# Patient Record
Sex: MALE | Race: WHITE | Employment: FULL TIME | URBAN - METROPOLITAN AREA
[De-identification: names, ages, dates, MRNs, and addresses within clinical notes are randomized per-mention and may not be internally consistent; named-entity substitution may affect disease eponyms.]

---

## 2019-01-10 ENCOUNTER — HOSPITAL ENCOUNTER (OUTPATIENT)
Dept: CARDIOLOGY | Age: 24
Discharge: HOME | End: 2019-01-10
Payer: COMMERCIAL

## 2021-07-31 ENCOUNTER — HOSPITAL ENCOUNTER (EMERGENCY)
Dept: HOSPITAL 83 - ED | Age: 26
Discharge: HOME | End: 2021-07-31
Payer: COMMERCIAL

## 2021-07-31 VITALS — WEIGHT: 315 LBS | HEIGHT: 75 IN | BODY MASS INDEX: 39.17 KG/M2
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
## 2021-07-31 DIAGNOSIS — M54.2: ICD-10-CM
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
## 2021-07-31 DIAGNOSIS — F17.200: ICD-10-CM
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
## 2021-07-31 DIAGNOSIS — S06.0X9A: Primary | ICD-10-CM

## 2021-11-09 ENCOUNTER — HOSPITAL ENCOUNTER (OUTPATIENT)
Dept: GENERAL RADIOLOGY | Age: 26
Discharge: HOME | End: 2021-11-11
Payer: COMMERCIAL

## 2021-11-09 ENCOUNTER — HOSPITAL ENCOUNTER (OUTPATIENT)
Age: 26
Discharge: HOME | End: 2021-11-11
Payer: COMMERCIAL

## 2021-11-09 DIAGNOSIS — R05.9 COUGH, UNSPECIFIED: ICD-10-CM

## 2021-11-09 PROCEDURE — 71046 X-RAY EXAM CHEST 2 VIEWS: CPT

## 2022-03-15 ENCOUNTER — HOSPITAL ENCOUNTER (OUTPATIENT)
Age: 27
Discharge: HOME | End: 2022-03-17
Payer: COMMERCIAL

## 2022-03-15 ENCOUNTER — HOSPITAL ENCOUNTER (OUTPATIENT)
Dept: GENERAL RADIOLOGY | Age: 27
Discharge: HOME | End: 2022-03-17
Payer: COMMERCIAL

## 2022-03-15 DIAGNOSIS — S90.32XA CONTUSION OF LEFT FOOT, INITIAL ENCOUNTER: ICD-10-CM

## 2022-03-15 PROCEDURE — 73630 X-RAY EXAM OF FOOT: CPT

## 2023-01-30 ENCOUNTER — APPOINTMENT (OUTPATIENT)
Dept: GENERAL RADIOLOGY | Age: 28
End: 2023-01-30
Payer: COMMERCIAL

## 2023-01-30 ENCOUNTER — HOSPITAL ENCOUNTER (OUTPATIENT)
Age: 28
Setting detail: OBSERVATION
Discharge: HOME | End: 2023-01-31
Attending: STUDENT IN AN ORGANIZED HEALTH CARE EDUCATION/TRAINING PROGRAM | Admitting: INTERNAL MEDICINE
Payer: COMMERCIAL

## 2023-01-30 DIAGNOSIS — R00.0 TACHYCARDIA: Primary | ICD-10-CM

## 2023-01-30 LAB
ALBUMIN SERPL-MCNC: 4.4 G/DL (ref 3.5–5.2)
ALP BLD-CCNC: 68 U/L (ref 40–129)
ALT SERPL-CCNC: 22 U/L (ref 0–40)
ANION GAP SERPL CALCULATED.3IONS-SCNC: 14 MMOL/L (ref 7–16)
AST SERPL-CCNC: 18 U/L (ref 0–39)
BASOPHILS ABSOLUTE: 0.06 E9/L (ref 0–0.2)
BASOPHILS RELATIVE PERCENT: 0.5 % (ref 0–2)
BILIRUB SERPL-MCNC: 0.3 MG/DL (ref 0–1.2)
BILIRUBIN URINE: NEGATIVE
BLOOD, URINE: NEGATIVE
BUN BLDV-MCNC: 11 MG/DL (ref 6–20)
CALCIUM SERPL-MCNC: 9.8 MG/DL (ref 8.6–10.2)
CHLORIDE BLD-SCNC: 101 MMOL/L (ref 98–107)
CLARITY: CLEAR
CO2: 23 MMOL/L (ref 22–29)
COLOR: YELLOW
CREAT SERPL-MCNC: 0.9 MG/DL (ref 0.7–1.2)
D DIMER: <200 NG/ML DDU
EOSINOPHILS ABSOLUTE: 0.06 E9/L (ref 0.05–0.5)
EOSINOPHILS RELATIVE PERCENT: 0.5 % (ref 0–6)
GFR SERPL CREATININE-BSD FRML MDRD: >60 ML/MIN/1.73
GLUCOSE BLD-MCNC: 104 MG/DL (ref 74–99)
GLUCOSE URINE: NEGATIVE MG/DL
HCT VFR BLD CALC: 48.1 % (ref 37–54)
HEMOGLOBIN: 15.9 G/DL (ref 12.5–16.5)
IMMATURE GRANULOCYTES #: 0.03 E9/L
IMMATURE GRANULOCYTES %: 0.3 % (ref 0–5)
KETONES, URINE: NEGATIVE MG/DL
LEUKOCYTE ESTERASE, URINE: NEGATIVE
LYMPHOCYTES ABSOLUTE: 1.1 E9/L (ref 1.5–4)
LYMPHOCYTES RELATIVE PERCENT: 9.3 % (ref 20–42)
MCH RBC QN AUTO: 29.2 PG (ref 26–35)
MCHC RBC AUTO-ENTMCNC: 33.1 % (ref 32–34.5)
MCV RBC AUTO: 88.3 FL (ref 80–99.9)
MONOCYTES ABSOLUTE: 0.72 E9/L (ref 0.1–0.95)
MONOCYTES RELATIVE PERCENT: 6.1 % (ref 2–12)
NEUTROPHILS ABSOLUTE: 9.88 E9/L (ref 1.8–7.3)
NEUTROPHILS RELATIVE PERCENT: 83.3 % (ref 43–80)
NITRITE, URINE: NEGATIVE
PDW BLD-RTO: 13.4 FL (ref 11.5–15)
PH UA: 6.5 (ref 5–9)
PLATELET # BLD: 344 E9/L (ref 130–450)
PMV BLD AUTO: 11.6 FL (ref 7–12)
POTASSIUM SERPL-SCNC: 4 MMOL/L (ref 3.5–5)
PROTEIN UA: NEGATIVE MG/DL
RBC # BLD: 5.45 E12/L (ref 3.8–5.8)
SODIUM BLD-SCNC: 138 MMOL/L (ref 132–146)
SPECIFIC GRAVITY UA: <=1.005 (ref 1–1.03)
TOTAL PROTEIN: 7.5 G/DL (ref 6.4–8.3)
TROPONIN, HIGH SENSITIVITY: <6 NG/L (ref 0–11)
TSH SERPL DL<=0.05 MIU/L-ACNC: 0.96 UIU/ML (ref 0.27–4.2)
UROBILINOGEN, URINE: 0.2 E.U./DL
WBC # BLD: 11.9 E9/L (ref 4.5–11.5)

## 2023-01-30 PROCEDURE — 96361 HYDRATE IV INFUSION ADD-ON: CPT

## 2023-01-30 PROCEDURE — 96360 HYDRATION IV INFUSION INIT: CPT

## 2023-01-30 PROCEDURE — G0378 HOSPITAL OBSERVATION PER HR: HCPCS

## 2023-01-30 PROCEDURE — 2580000003 HC RX 258: Performed by: STUDENT IN AN ORGANIZED HEALTH CARE EDUCATION/TRAINING PROGRAM

## 2023-01-30 PROCEDURE — 85025 COMPLETE CBC W/AUTO DIFF WBC: CPT

## 2023-01-30 PROCEDURE — 96372 THER/PROPH/DIAG INJ SC/IM: CPT

## 2023-01-30 PROCEDURE — 85378 FIBRIN DEGRADE SEMIQUANT: CPT

## 2023-01-30 PROCEDURE — 6360000002 HC RX W HCPCS: Performed by: PHYSICIAN ASSISTANT

## 2023-01-30 PROCEDURE — 2580000003 HC RX 258: Performed by: PHYSICIAN ASSISTANT

## 2023-01-30 PROCEDURE — 80053 COMPREHEN METABOLIC PANEL: CPT

## 2023-01-30 PROCEDURE — 84443 ASSAY THYROID STIM HORMONE: CPT

## 2023-01-30 PROCEDURE — 99285 EMERGENCY DEPT VISIT HI MDM: CPT

## 2023-01-30 PROCEDURE — 71045 X-RAY EXAM CHEST 1 VIEW: CPT

## 2023-01-30 PROCEDURE — 81003 URINALYSIS AUTO W/O SCOPE: CPT

## 2023-01-30 PROCEDURE — 84484 ASSAY OF TROPONIN QUANT: CPT

## 2023-01-30 PROCEDURE — 93005 ELECTROCARDIOGRAM TRACING: CPT | Performed by: STUDENT IN AN ORGANIZED HEALTH CARE EDUCATION/TRAINING PROGRAM

## 2023-01-30 RX ORDER — ACETAMINOPHEN 325 MG/1
650 TABLET ORAL EVERY 6 HOURS PRN
Status: DISCONTINUED | OUTPATIENT
Start: 2023-01-30 | End: 2023-01-31 | Stop reason: HOSPADM

## 2023-01-30 RX ORDER — SODIUM CHLORIDE 0.9 % (FLUSH) 0.9 %
5-40 SYRINGE (ML) INJECTION PRN
Status: DISCONTINUED | OUTPATIENT
Start: 2023-01-30 | End: 2023-01-31 | Stop reason: HOSPADM

## 2023-01-30 RX ORDER — ENOXAPARIN SODIUM 100 MG/ML
40 INJECTION SUBCUTANEOUS 2 TIMES DAILY
Status: DISCONTINUED | OUTPATIENT
Start: 2023-01-30 | End: 2023-01-31 | Stop reason: HOSPADM

## 2023-01-30 RX ORDER — SODIUM CHLORIDE 0.9 % (FLUSH) 0.9 %
5-40 SYRINGE (ML) INJECTION EVERY 12 HOURS SCHEDULED
Status: DISCONTINUED | OUTPATIENT
Start: 2023-01-30 | End: 2023-01-31 | Stop reason: HOSPADM

## 2023-01-30 RX ORDER — PROMETHAZINE HYDROCHLORIDE 25 MG/1
12.5 TABLET ORAL EVERY 6 HOURS PRN
Status: DISCONTINUED | OUTPATIENT
Start: 2023-01-30 | End: 2023-01-31 | Stop reason: HOSPADM

## 2023-01-30 RX ORDER — ONDANSETRON 2 MG/ML
4 INJECTION INTRAMUSCULAR; INTRAVENOUS EVERY 6 HOURS PRN
Status: DISCONTINUED | OUTPATIENT
Start: 2023-01-30 | End: 2023-01-31 | Stop reason: HOSPADM

## 2023-01-30 RX ORDER — 0.9 % SODIUM CHLORIDE 0.9 %
1000 INTRAVENOUS SOLUTION INTRAVENOUS ONCE
Status: COMPLETED | OUTPATIENT
Start: 2023-01-30 | End: 2023-01-30

## 2023-01-30 RX ORDER — POLYETHYLENE GLYCOL 3350 17 G/17G
17 POWDER, FOR SOLUTION ORAL DAILY PRN
Status: DISCONTINUED | OUTPATIENT
Start: 2023-01-30 | End: 2023-01-31 | Stop reason: HOSPADM

## 2023-01-30 RX ORDER — SODIUM CHLORIDE 9 MG/ML
25 INJECTION, SOLUTION INTRAVENOUS PRN
Status: DISCONTINUED | OUTPATIENT
Start: 2023-01-30 | End: 2023-01-31 | Stop reason: HOSPADM

## 2023-01-30 RX ORDER — ACETAMINOPHEN 650 MG/1
650 SUPPOSITORY RECTAL EVERY 6 HOURS PRN
Status: DISCONTINUED | OUTPATIENT
Start: 2023-01-30 | End: 2023-01-31 | Stop reason: HOSPADM

## 2023-01-30 RX ADMIN — SODIUM CHLORIDE 1000 ML: 9 INJECTION, SOLUTION INTRAVENOUS at 15:32

## 2023-01-30 RX ADMIN — SODIUM CHLORIDE, PRESERVATIVE FREE 10 ML: 5 INJECTION INTRAVENOUS at 22:53

## 2023-01-30 RX ADMIN — ENOXAPARIN SODIUM 40 MG: 100 INJECTION SUBCUTANEOUS at 22:53

## 2023-01-30 ASSESSMENT — ENCOUNTER SYMPTOMS
DIARRHEA: 0
SORE THROAT: 0
COUGH: 0
EYE DISCHARGE: 0
EYE PAIN: 0
WHEEZING: 0
ABDOMINAL PAIN: 0
VOMITING: 0
SINUS PRESSURE: 0
EYE REDNESS: 0
NAUSEA: 0
SHORTNESS OF BREATH: 0
BACK PAIN: 0

## 2023-01-30 ASSESSMENT — PAIN - FUNCTIONAL ASSESSMENT
PAIN_FUNCTIONAL_ASSESSMENT: NONE - DENIES PAIN

## 2023-01-30 ASSESSMENT — LIFESTYLE VARIABLES
HOW MANY STANDARD DRINKS CONTAINING ALCOHOL DO YOU HAVE ON A TYPICAL DAY: 1 OR 2
HOW OFTEN DO YOU HAVE A DRINK CONTAINING ALCOHOL: MONTHLY OR LESS

## 2023-01-30 NOTE — ED PROVIDER NOTES
Department of Emergency Medicine   ED  Provider Note  Admit Date/RoomTime: 1/30/2023  1:47 PM  ED Room: [facility]/[address]     Christina Ferreira is a 32 y.o. male with a PMHx significant for  overweight  who presents for evaluation of palpitations, lightheadedness, beginning prior to arrival.  The complaint has been intermittent, moderate in severity, and worsened by nothing. The patient states that he has been having issues on and off for quite some time well over a year now with waking up feeling some palpitations and lightheadedness. States he is spoken with his PCP about this and they believe it was more anxiety. Notes that today it has been persistent which raises concerns for his presenting for further evaluation treatment. States that woke up this morning feeling lightheaded dizzy and passed out with associated palpitations. States that in the past is gone away however this time it has persisted. Currently does feel improved. Denies any recreational drug use, alcohol use. States that he tried lose weight therefore he has 2 cups of coffee denies any caffeine intake. He is not on any other stimulants. Denies any nausea, vomiting, diarrhea, urinary symptoms. Review of Systems   Constitutional:  Negative for chills and fever. HENT:  Negative for ear pain, sinus pressure and sore throat. Eyes:  Negative for pain, discharge and redness. Respiratory:  Negative for cough, shortness of breath and wheezing. Cardiovascular:  Positive for palpitations. Negative for chest pain. Gastrointestinal:  Negative for abdominal pain, diarrhea, nausea and vomiting. Genitourinary:  Negative for dysuria and frequency. Musculoskeletal:  Negative for arthralgias and back pain. Skin:  Negative for rash and wound. Neurological:  Positive for light-headedness. Negative for weakness and headaches. Hematological:  Negative for adenopathy. All other systems reviewed and are negative. Physical Exam  Vitals and nursing note reviewed. Constitutional:       General: He is not in acute distress. Appearance: Normal appearance. He is well-developed. He is obese. He is not ill-appearing. HENT:      Head: Normocephalic and atraumatic. Right Ear: External ear normal.      Left Ear: External ear normal.   Eyes:      General:         Right eye: No discharge. Left eye: No discharge. Extraocular Movements: Extraocular movements intact. Conjunctiva/sclera: Conjunctivae normal.   Cardiovascular:      Rate and Rhythm: Regular rhythm. Tachycardia present. Heart sounds: Normal heart sounds. No murmur heard. Pulmonary:      Effort: Pulmonary effort is normal. No respiratory distress. Breath sounds: Normal breath sounds. No stridor. No wheezing or rhonchi. Abdominal:      General: There is no distension. Palpations: Abdomen is soft. There is no mass. Tenderness: There is no abdominal tenderness. Hernia: No hernia is present. Musculoskeletal:         General: No swelling or tenderness. Cervical back: Normal range of motion and neck supple. Skin:     General: Skin is warm and dry. Coloration: Skin is not jaundiced or pale. Neurological:      General: No focal deficit present. Mental Status: He is alert. Procedures    [facility]       ED Course as of 02/01/23 1218   Mon Jan 30, 2023   1847 Spoke with Carisa Gilbert for Dr. Amy Arciniega, discussed the patient  He will admit the patient. [BB]      ED Course User Index  [BB] [facility] DO        Differential diagnosis: cardiac arrhythmia, hyperthyroidism, PE, electrolyte abnormality, less likely CAD,   Review of ED/ Outpatient Records: none  Historians that case was discussed with: none  My EKG interpretation:   EKG: This EKG is signed and interpreted by me.     Rate: 140  Rhythm: Sinus  Interpretation: sinus tachycardia, no visible ST-Elevations or depressions; non-specific t-waves, , QRS 88, QTc 418  Comparison: no previous EKG available   Imaging interpretation by myself: CXR negative for evidence of infiltrate or pneumothorax  Discussed with other providers: Brian Schaeffer for Dr. Ra Fraga who will admit the patient  Tests Considered but not ordered: none  Decision making tools/risks stratification / MDM / Independent Interpretation of labs: Ej Marrero presents to the ED for evaluation of palpitations and lightheadedness. History from patient and mother/father at bedside, with no limitations. Workup in the ED revealed patient in no acute distress. He was tachycardic on presentation with HR of 138 and 140 on EKG. Labs were obtained, ddimer <200 making PE less likely, troponin <6, electrolytes within normal limits. He was given IVF with some improvement of HR but he continued to be tachycardic in the department despite this. There was no evidence of infection, patient was afebrile, not hypotensive, slight leukocytosis of 11.9. CXR negative for acute findings. It is felt the patient needs admission for further evaluation from cardiology. Social Determinants of health impacting treatment or disposition: none  CODE status and Discussions: nonePatient requires continued workup and management of their symptoms and will be admitted to the hospital for further evaluation and treatment. I am the Primary Clinician of Record.      --------------------------------------------- PAST HISTORY ---------------------------------------------  Past Medical History:  has a past medical history of Fractures, Metatarsalgia of left foot, Sprain, and Sprain. Past Surgical History:  has a past surgical history that includes Tonsillectomy; Cutler tooth extraction; and Parotidectomy. Social History:  reports that he has never smoked. He has never used smokeless tobacco.    Family History: family history is not on file. The patients home medications have been reviewed.     Allergies: Patient has no known allergies.     -------------------------------------------------- RESULTS -------------------------------------------------    LABS:  Results for orders placed or performed during the hospital encounter of 01/30/23   CBC with Auto Differential   Result Value Ref Range    WBC 11.9 (H) 4.5 - 11.5 E9/L    RBC 5.45 3.80 - 5.80 E12/L    Hemoglobin 15.9 12.5 - 16.5 g/dL    Hematocrit 48.1 37.0 - 54.0 %    MCV 88.3 80.0 - 99.9 fL    MCH 29.2 26.0 - 35.0 pg    MCHC 33.1 32.0 - 34.5 %    RDW 13.4 11.5 - 15.0 fL    Platelets 903 298 - 161 E9/L    MPV 11.6 7.0 - 12.0 fL    Neutrophils % 83.3 (H) 43.0 - 80.0 %    Immature Granulocytes % 0.3 0.0 - 5.0 %    Lymphocytes % 9.3 (L) 20.0 - 42.0 %    Monocytes % 6.1 2.0 - 12.0 %    Eosinophils % 0.5 0.0 - 6.0 %    Basophils % 0.5 0.0 - 2.0 %    Neutrophils Absolute 9.88 (H) 1.80 - 7.30 E9/L    Immature Granulocytes # 0.03 E9/L    Lymphocytes Absolute 1.10 (L) 1.50 - 4.00 E9/L    Monocytes Absolute 0.72 0.10 - 0.95 E9/L    Eosinophils Absolute 0.06 0.05 - 0.50 E9/L    Basophils Absolute 0.06 0.00 - 0.20 E9/L   CMP   Result Value Ref Range    Sodium 138 132 - 146 mmol/L    Potassium 4.0 3.5 - 5.0 mmol/L    Chloride 101 98 - 107 mmol/L    CO2 23 22 - 29 mmol/L    Anion Gap 14 7 - 16 mmol/L    Glucose 104 (H) 74 - 99 mg/dL    BUN 11 6 - 20 mg/dL    Creatinine 0.9 0.7 - 1.2 mg/dL    Est, Glom Filt Rate >60 >=60 mL/min/1.73    Calcium 9.8 8.6 - 10.2 mg/dL    Total Protein 7.5 6.4 - 8.3 g/dL    Albumin 4.4 3.5 - 5.2 g/dL    Total Bilirubin 0.3 0.0 - 1.2 mg/dL    Alkaline Phosphatase 68 40 - 129 U/L    ALT 22 0 - 40 U/L    AST 18 0 - 39 U/L   Troponin   Result Value Ref Range    Troponin, High Sensitivity <6 0 - 11 ng/L   TSH   Result Value Ref Range    TSH 0.964 0.270 - 4.200 uIU/mL   Urinalysis   Result Value Ref Range    Color, UA Yellow Straw/Yellow    Clarity, UA Clear Clear    Glucose, Ur Negative Negative mg/dL    Bilirubin Urine Negative Negative    Ketones, Urine Negative Negative mg/dL    Specific Gravity, UA <=1.005 1.005 - 1.030    Blood, Urine Negative Negative    pH, UA 6.5 5.0 - 9.0    Protein, UA Negative Negative mg/dL    Urobilinogen, Urine 0.2 <2.0 E.U./dL    Nitrite, Urine Negative Negative    Leukocyte Esterase, Urine Negative Negative   D-Dimer, Quantitative   Result Value Ref Range    D-Dimer, Quant <200 ng/mL DDU   Comprehensive Metabolic Panel w/ Reflex to MG   Result Value Ref Range    Sodium 139 132 - 146 mmol/L    Potassium reflex Magnesium 3.5 3.5 - 5.0 mmol/L    Chloride 104 98 - 107 mmol/L    CO2 23 22 - 29 mmol/L    Anion Gap 12 7 - 16 mmol/L    Glucose 88 74 - 99 mg/dL    BUN 8 6 - 20 mg/dL    Creatinine 0.8 0.7 - 1.2 mg/dL    Est, Glom Filt Rate >60 >=60 mL/min/1.73    Calcium 8.8 8.6 - 10.2 mg/dL    Total Protein 6.5 6.4 - 8.3 g/dL    Albumin 3.8 3.5 - 5.2 g/dL    Total Bilirubin 0.5 0.0 - 1.2 mg/dL    Alkaline Phosphatase 62 40 - 129 U/L    ALT 17 0 - 40 U/L    AST 14 0 - 39 U/L   CBC auto differential   Result Value Ref Range    WBC 10.1 4.5 - 11.5 E9/L    RBC 4.88 3.80 - 5.80 E12/L    Hemoglobin 14.4 12.5 - 16.5 g/dL    Hematocrit 43.3 37.0 - 54.0 %    MCV 88.7 80.0 - 99.9 fL    MCH 29.5 26.0 - 35.0 pg    MCHC 33.3 32.0 - 34.5 %    RDW 13.5 11.5 - 15.0 fL    Platelets 326 539 - 125 E9/L    MPV 10.8 7.0 - 12.0 fL    Neutrophils % 65.2 43.0 - 80.0 %    Immature Granulocytes % 0.3 0.0 - 5.0 %    Lymphocytes % 25.1 20.0 - 42.0 %    Monocytes % 7.9 2.0 - 12.0 %    Eosinophils % 0.9 0.0 - 6.0 %    Basophils % 0.6 0.0 - 2.0 %    Neutrophils Absolute 6.57 1.80 - 7.30 E9/L    Immature Granulocytes # 0.03 E9/L    Lymphocytes Absolute 2.53 1.50 - 4.00 E9/L    Monocytes Absolute 0.80 0.10 - 0.95 E9/L    Eosinophils Absolute 0.09 0.05 - 0.50 E9/L    Basophils Absolute 0.06 0.00 - 0.20 E9/L   URINE DRUG SCREEN   Result Value Ref Range    Amphetamine Screen, Urine NOT DETECTED Negative <1000 ng/mL    Barbiturate Screen, Ur NOT DETECTED Negative < 200 ng/mL    Benzodiazepine Screen, Urine NOT DETECTED Negative < 200 ng/mL    Cannabinoid Scrn, Ur NOT DETECTED Negative < 50ng/mL    Cocaine Metabolite Screen, Urine NOT DETECTED Negative < 300 ng/mL    Opiate Scrn, Ur NOT DETECTED Negative < 300ng/mL    PCP Screen, Urine NOT DETECTED Negative < 25 ng/mL    Methadone Screen, Urine NOT DETECTED Negative <300 ng/mL    Oxycodone Urine NOT DETECTED Negative <100 ng/mL    FENTANYL SCREEN, URINE NOT DETECTED Negative <1 ng/mL    Drug Screen Comment: see below    Magnesium   Result Value Ref Range    Magnesium 2.1 1.6 - 2.6 mg/dL   Hemoglobin A1C   Result Value Ref Range    Hemoglobin A1C 5.2 4.0 - 5.6 %   T3, Free   Result Value Ref Range    T3, Free 3.6 2.0 - 4.4 pg/mL   T4, Free   Result Value Ref Range    T4 Free 1.40 0.93 - 1.70 ng/dL   EKG 12 Lead   Result Value Ref Range    Ventricular Rate 140 BPM    Atrial Rate 140 BPM    P-R Interval 146 ms    QRS Duration 88 ms    Q-T Interval 274 ms    QTc Calculation (Bazett) 418 ms    P Axis 52 degrees    R Axis 53 degrees    T Axis 21 degrees       RADIOLOGY:  XR CHEST PORTABLE   Final Result   No acute process. ------------------------- NURSING NOTES AND VITALS REVIEWED ---------------------------  Date / Time Roomed:  1/30/2023  1:47 PM  ED Bed Assignment:  [id number]    The nursing notes within the ED encounter and vital signs as below have been reviewed. Patient Vitals for the past 24 hrs:   BP   01/31/23 1615 (!) 142/95       Oxygen Saturation Interpretation: Normal    ------------------------------------------ PROGRESS NOTES ------------------------------------------  Counseling:  I have spoken with the patient and discussed todays results, in addition to providing specific details for the plan of care and counseling regarding the diagnosis and prognosis.   Their questions are answered at this time and they are agreeable with the plan of admission.    --------------------------------- ADDITIONAL PROVIDER NOTES ---------------------------------  Consultations:  Spoke with Allie Hernandes for Dr. Evangelist Rose. Discussed case. They will admit the patient. This patient's ED course included: a personal history and physicial examination, re-evaluation prior to disposition, multiple bedside re-evaluations, IV medications, cardiac monitoring, continuous pulse oximetry, and complex medical decision making and emergency management    This patient has remained hemodynamically stable during their ED course. Diagnosis:  1. Tachycardia        Disposition:  Patient's disposition: Admit to telemetry  Patient's condition is stable. Please note that the above documentation was prepared using voice recognition software. Every attempt was made to ensure accuracy but there may be spelling, grammatical, and contextual errors.          Jarett Madrid DO  02/01/23 1233

## 2023-01-31 VITALS
HEART RATE: 83 BPM | TEMPERATURE: 98.1 F | RESPIRATION RATE: 16 BRPM | OXYGEN SATURATION: 98 % | WEIGHT: 315 LBS | BODY MASS INDEX: 39.17 KG/M2 | DIASTOLIC BLOOD PRESSURE: 95 MMHG | HEIGHT: 75 IN | SYSTOLIC BLOOD PRESSURE: 142 MMHG

## 2023-01-31 LAB
ALBUMIN SERPL-MCNC: 3.8 G/DL (ref 3.5–5.2)
ALP BLD-CCNC: 62 U/L (ref 40–129)
ALT SERPL-CCNC: 17 U/L (ref 0–40)
AMPHETAMINE SCREEN, URINE: NOT DETECTED
ANION GAP SERPL CALCULATED.3IONS-SCNC: 12 MMOL/L (ref 7–16)
AST SERPL-CCNC: 14 U/L (ref 0–39)
BARBITURATE SCREEN URINE: NOT DETECTED
BASOPHILS ABSOLUTE: 0.06 E9/L (ref 0–0.2)
BASOPHILS RELATIVE PERCENT: 0.6 % (ref 0–2)
BENZODIAZEPINE SCREEN, URINE: NOT DETECTED
BILIRUB SERPL-MCNC: 0.5 MG/DL (ref 0–1.2)
BUN BLDV-MCNC: 8 MG/DL (ref 6–20)
CALCIUM SERPL-MCNC: 8.8 MG/DL (ref 8.6–10.2)
CANNABINOID SCREEN URINE: NOT DETECTED
CHLORIDE BLD-SCNC: 104 MMOL/L (ref 98–107)
CO2: 23 MMOL/L (ref 22–29)
COCAINE METABOLITE SCREEN URINE: NOT DETECTED
CREAT SERPL-MCNC: 0.8 MG/DL (ref 0.7–1.2)
EKG ATRIAL RATE: 140 BPM
EKG P AXIS: 52 DEGREES
EKG P-R INTERVAL: 146 MS
EKG Q-T INTERVAL: 274 MS
EKG QRS DURATION: 88 MS
EKG QTC CALCULATION (BAZETT): 418 MS
EKG R AXIS: 53 DEGREES
EKG T AXIS: 21 DEGREES
EKG VENTRICULAR RATE: 140 BPM
EOSINOPHILS ABSOLUTE: 0.09 E9/L (ref 0.05–0.5)
EOSINOPHILS RELATIVE PERCENT: 0.9 % (ref 0–6)
FENTANYL SCREEN, URINE: NOT DETECTED
GFR SERPL CREATININE-BSD FRML MDRD: >60 ML/MIN/1.73
GLUCOSE BLD-MCNC: 88 MG/DL (ref 74–99)
HBA1C MFR BLD: 5.2 % (ref 4–5.6)
HCT VFR BLD CALC: 43.3 % (ref 37–54)
HEMOGLOBIN: 14.4 G/DL (ref 12.5–16.5)
IMMATURE GRANULOCYTES #: 0.03 E9/L
IMMATURE GRANULOCYTES %: 0.3 % (ref 0–5)
LYMPHOCYTES ABSOLUTE: 2.53 E9/L (ref 1.5–4)
LYMPHOCYTES RELATIVE PERCENT: 25.1 % (ref 20–42)
Lab: NORMAL
MAGNESIUM: 2.1 MG/DL (ref 1.6–2.6)
MCH RBC QN AUTO: 29.5 PG (ref 26–35)
MCHC RBC AUTO-ENTMCNC: 33.3 % (ref 32–34.5)
MCV RBC AUTO: 88.7 FL (ref 80–99.9)
METHADONE SCREEN, URINE: NOT DETECTED
MONOCYTES ABSOLUTE: 0.8 E9/L (ref 0.1–0.95)
MONOCYTES RELATIVE PERCENT: 7.9 % (ref 2–12)
NEUTROPHILS ABSOLUTE: 6.57 E9/L (ref 1.8–7.3)
NEUTROPHILS RELATIVE PERCENT: 65.2 % (ref 43–80)
OPIATE SCREEN URINE: NOT DETECTED
OXYCODONE URINE: NOT DETECTED
PDW BLD-RTO: 13.5 FL (ref 11.5–15)
PHENCYCLIDINE SCREEN URINE: NOT DETECTED
PLATELET # BLD: 295 E9/L (ref 130–450)
PMV BLD AUTO: 10.8 FL (ref 7–12)
POTASSIUM REFLEX MAGNESIUM: 3.5 MMOL/L (ref 3.5–5)
RBC # BLD: 4.88 E12/L (ref 3.8–5.8)
SODIUM BLD-SCNC: 139 MMOL/L (ref 132–146)
T3 FREE: 3.6 PG/ML (ref 2–4.4)
T4 FREE: 1.4 NG/DL (ref 0.93–1.7)
TOTAL PROTEIN: 6.5 G/DL (ref 6.4–8.3)
WBC # BLD: 10.1 E9/L (ref 4.5–11.5)

## 2023-01-31 PROCEDURE — 96372 THER/PROPH/DIAG INJ SC/IM: CPT

## 2023-01-31 PROCEDURE — 80307 DRUG TEST PRSMV CHEM ANLYZR: CPT

## 2023-01-31 PROCEDURE — 93010 ELECTROCARDIOGRAM REPORT: CPT | Performed by: INTERNAL MEDICINE

## 2023-01-31 PROCEDURE — 83735 ASSAY OF MAGNESIUM: CPT

## 2023-01-31 PROCEDURE — 84481 FREE ASSAY (FT-3): CPT

## 2023-01-31 PROCEDURE — 84439 ASSAY OF FREE THYROXINE: CPT

## 2023-01-31 PROCEDURE — 2580000003 HC RX 258: Performed by: PHYSICIAN ASSISTANT

## 2023-01-31 PROCEDURE — 36415 COLL VENOUS BLD VENIPUNCTURE: CPT

## 2023-01-31 PROCEDURE — 99222 1ST HOSP IP/OBS MODERATE 55: CPT | Performed by: INTERNAL MEDICINE

## 2023-01-31 PROCEDURE — 6360000002 HC RX W HCPCS: Performed by: PHYSICIAN ASSISTANT

## 2023-01-31 PROCEDURE — 85025 COMPLETE CBC W/AUTO DIFF WBC: CPT

## 2023-01-31 PROCEDURE — 83036 HEMOGLOBIN GLYCOSYLATED A1C: CPT

## 2023-01-31 PROCEDURE — 80053 COMPREHEN METABOLIC PANEL: CPT

## 2023-01-31 PROCEDURE — 6370000000 HC RX 637 (ALT 250 FOR IP): Performed by: PHYSICIAN ASSISTANT

## 2023-01-31 PROCEDURE — G0378 HOSPITAL OBSERVATION PER HR: HCPCS

## 2023-01-31 RX ORDER — LISINOPRIL 5 MG/1
5 TABLET ORAL DAILY
Qty: 30 TABLET | Refills: 3 | Status: SHIPPED | OUTPATIENT
Start: 2023-01-31

## 2023-01-31 RX ORDER — LISINOPRIL 5 MG/1
5 TABLET ORAL DAILY
Status: DISCONTINUED | OUTPATIENT
Start: 2023-01-31 | End: 2023-01-31 | Stop reason: HOSPADM

## 2023-01-31 RX ADMIN — SODIUM CHLORIDE, PRESERVATIVE FREE 10 ML: 5 INJECTION INTRAVENOUS at 08:07

## 2023-01-31 RX ADMIN — ACETAMINOPHEN 650 MG: 325 TABLET ORAL at 08:04

## 2023-01-31 RX ADMIN — ENOXAPARIN SODIUM 40 MG: 100 INJECTION SUBCUTANEOUS at 08:05

## 2023-01-31 NOTE — CONSULTS
CHIEF COMPLAINT: Tachycardia    HISTORY OF PRESENT ILLNESS: Patient is a 32 y.o. male seen at the request of Paul Sneed MD and not followed by cardiology. Patient presents with tachycardia issues. States he has had issues of racing heart for several years, however increased of late. States he was on the way to work when he had an episode. States heart rate went to 160's. States he gets lightheaded and some episodes of near syncope, but no erika syncope. Denies other known past history aside from obesity. Currently without symptoms.      Past Medical History:   Diagnosis Date    Fractures 10/08/2012    Possible oblique fx at the base of the ulnar styloid    Metatarsalgia of left foot 2012    Mid and forefoot for 2 years - cavus foot    Sprain 11/18/2006    Left wrist sprain, possible scapholunate injury    Sprain 02/2008    Right knee medial collateral strain, possible contusion       Patient Active Problem List   Diagnosis    Tachycardia       No Known Allergies    Current Facility-Administered Medications   Medication Dose Route Frequency Provider Last Rate Last Admin    perflutren lipid microspheres (DEFINITY) injection 1.5 mL  1.5 mL IntraVENous ONCE PRN Clemetine Blunt, DO        sodium chloride flush 0.9 % injection 5-40 mL  5-40 mL IntraVENous 2 times per day Normie Barthel, PA   10 mL at 01/31/23 0807    sodium chloride flush 0.9 % injection 5-40 mL  5-40 mL IntraVENous PRN Normie Barthel, PA        0.9 % sodium chloride infusion  25 mL IntraVENous PRN Normie Barthel, PA        enoxaparin (LOVENOX) injection 40 mg  40 mg SubCUTAneous BID Normie Barthel, PA   40 mg at 01/31/23 0805    promethazine (PHENERGAN) tablet 12.5 mg  12.5 mg Oral Q6H PRN Normie Barthel, PA        Or    ondansetron (ZOFRAN) injection 4 mg  4 mg IntraVENous Q6H PRN Normie Barthel, PA        polyethylene glycol (GLYCOLAX) packet 17 g  17 g Oral Daily PRN Normie Barthel, PA        acetaminophen (TYLENOL) tablet 650 mg 650 mg Oral Q6H PRN JOLENE Jang   650 mg at 01/31/23 1510    Or    acetaminophen (TYLENOL) suppository 650 mg  650 mg Rectal Q6H PRN JOLENE Jang           Social History     Socioeconomic History    Marital status: Single     Spouse name: Not on file    Number of children: Not on file    Years of education: Not on file    Highest education level: Not on file   Occupational History    Not on file   Tobacco Use    Smoking status: Never    Smokeless tobacco: Never   Substance and Sexual Activity    Alcohol use: Not on file    Drug use: Not on file    Sexual activity: Not on file   Other Topics Concern    Not on file   Social History Narrative    Not on file     Social Determinants of Health     Financial Resource Strain: Not on file   Food Insecurity: Not on file   Transportation Needs: Not on file   Physical Activity: Not on file   Stress: Not on file   Social Connections: Not on file   Intimate Partner Violence: Not on file   Housing Stability: Not on file       History reviewed. No pertinent family history. Review of Systems:   Heart: as above   Lungs: as above   Eyes: denies changes in vision or discharge. Ears: denies changes in hearing or pain. Nose: denies epistaxis or masses   Throat: denies sore throat or trouble swallowing. Neuro: denies numbness, tingling, tremors. Skin: denies rashes or itching. : denies hematuria, dysuria   GI: denies vomiting, diarrhea   Psych: denies mood changed, anxiety, depression. all others negative. Physical Exam   BP (!) 154/86   Pulse 83   Temp 98.1 °F (36.7 °C) (Oral)   Resp 16   Ht 6' 3\" (1.905 m)   Wt (!) 337 lb 8 oz (153.1 kg)   SpO2 98%   BMI 42.18 kg/m²   Constitutional: Oriented to person, place, and time. Well-developed and well-nourished. No distress. Head: Normocephalic and atraumatic. Eyes: EOM are normal. Pupils are equal, round, and reactive to light. Neck: Normal range of motion. Neck supple.  No hepatojugular reflux and no JVD present. Carotid bruit is not present. No tracheal deviation present. No thyromegaly present. Cardiovascular: Normal rate, regular rhythm, normal heart sounds and intact distal pulses. Exam reveals no gallop and no friction rub. No murmur heard. Pulmonary/Chest: Effort normal and breath sounds normal. No respiratory distress. No wheezes. No rales. No tenderness. Abdominal: Soft. Bowel sounds are normal. No distension and no mass. No tenderness. No rebound and no guarding. Musculoskeletal: Normal range of motion. No edema and no tenderness. Lymphadenopathy:   No cervical adenopathy. No groin adenopathy. Neurological: Alert and oriented to person, place, and time. Skin: Skin is warm and dry. No rash noted. Not diaphoretic. No erythema. Psychiatric: Normal mood and affect. Behavior is normal.     CBC:   Lab Results   Component Value Date/Time    WBC 10.1 01/31/2023 02:08 AM    RBC 4.88 01/31/2023 02:08 AM    HGB 14.4 01/31/2023 02:08 AM    HCT 43.3 01/31/2023 02:08 AM    MCV 88.7 01/31/2023 02:08 AM    MCH 29.5 01/31/2023 02:08 AM    MCHC 33.3 01/31/2023 02:08 AM    RDW 13.5 01/31/2023 02:08 AM     01/31/2023 02:08 AM    MPV 10.8 01/31/2023 02:08 AM     BMP:   Lab Results   Component Value Date/Time     01/31/2023 02:08 AM    K 3.5 01/31/2023 02:08 AM     01/31/2023 02:08 AM    CO2 23 01/31/2023 02:08 AM    BUN 8 01/31/2023 02:08 AM    LABALBU 3.8 01/31/2023 02:08 AM    CREATININE 0.8 01/31/2023 02:08 AM    CALCIUM 8.8 01/31/2023 02:08 AM    LABGLOM >60 01/31/2023 02:08 AM     Magnesium:    Lab Results   Component Value Date/Time    MG 2.1 01/31/2023 02:08 AM     Cardiac Enzymes:   Lab Results   Component Value Date    TROPHS <6 01/30/2023      PT/INR:  No results found for: PROTIME, INR  TSH:    Lab Results   Component Value Date/Time    TSH 0.964 01/30/2023 02:46 PM     Rhythm Strip: normal sinus rhythm.     EKG:  nonspecific ST and T waves changes, sinus tachycardia.    ASSESSMENT AND PLAN:  Patient Active Problem List   Diagnosis    Tachycardia     1. Tachycardia:    Chart/labs normal/EKG reviewed.     No pathological rhythms documented. No overt etiology for sinus tachycardia revealed. Episodes suspicious for SVT.     Tox screen negative. D-dimer and TSH normal. TFT pending.     Monitor while here.    Echo and 7 day outpatient monitor and outpatient follow up.    Cesilia Reynoso D.O.  Cardiologist  Cardiology, [facility]

## 2023-01-31 NOTE — PLAN OF CARE
Medicare Wellness Visit, Female The best way to live healthy is to have a lifestyle where you eat a well-balanced diet, exercise regularly, limit alcohol use, and quit all forms of tobacco/nicotine, if applicable. Regular preventive services are another way to keep healthy. Preventive services (vaccines, screening tests, monitoring & exams) can help personalize your care plan, which helps you manage your own care. Screening tests can find health problems at the earliest stages, when they are easiest to treat. [facility] follows the current, evidence-based guidelines published by the [facility] Francois Headley ([facility]) when recommending preventive services for our patients. Because we follow these guidelines, sometimes recommendations change over time as research supports it. (For example, mammograms used to be recommended annually. Even though Medicare will still pay for an annual mammogram, the newer guidelines recommend a mammogram every two years for women of average risk). Of course, you and your doctor may decide to screen more often for some diseases, based on your risk and your co-morbidities (chronic disease you are already diagnosed with). Preventive services for you include: - Medicare offers their members a free annual wellness visit, which is time for you and your primary care provider to discuss and plan for your preventive service needs. Take advantage of this benefit every year! 
-All adults over the age of 72 should receive the recommended pneumonia vaccines. Current USPSTF guidelines recommend a series of two vaccines for the best pneumonia protection.  
-All adults should have a flu vaccine yearly and a tetanus vaccine every 10 years.  
-All adults age 48 and older should receive the shingles vaccines (series of two vaccines). -All adults age 38-68 who are overweight should have a diabetes screening test once every three years. Problem: Discharge Planning  Goal: Discharge to home or other facility with appropriate resources  1/31/2023 1015 by Bette Conklin RN  Outcome: Progressing  1/30/2023 2114 by Barry Pacheco RN  Outcome: Progressing  Flowsheets (Taken 1/30/2023 2103)  Discharge to home or other facility with appropriate resources: Identify barriers to discharge with patient and caregiver -All adults born between [birth year] and 1965 should be screened once for Hepatitis C. 
-Other screening tests and preventive services for persons with diabetes include: an eye exam to screen for diabetic retinopathy, a kidney function test, a foot exam, and stricter control over your cholesterol.  
-Cardiovascular screening for adults with routine risk involves an electrocardiogram (ECG) at intervals determined by your doctor.  
-Colorectal cancer screenings should be done for adults age 54-65 with no increased risk factors for colorectal cancer. There are a number of acceptable methods of screening for this type of cancer. Each test has its own benefits and drawbacks. Discuss with your doctor what is most appropriate for you during your annual wellness visit. The different tests include: colonoscopy (considered the best screening method), a fecal occult blood test, a fecal DNA test, and sigmoidoscopy. 
 
-A bone mass density test is recommended when a woman turns 65 to screen for osteoporosis. This test is only recommended one time, as a screening. Some providers will use this same test as a disease monitoring tool if you already have osteoporosis. -Breast cancer screenings are recommended every other year for women of normal risk, age 54-69. 
-Cervical cancer screenings for women over age 72 are only recommended with certain risk factors. Here is a list of your current Health Maintenance items (your personalized list of preventive services) with a due date: 
Health Maintenance Due Topic Date Due  Shingles Vaccine (1 of 2) 07/02/1982 [facility] Annual Well Visit  04/23/2020

## 2023-01-31 NOTE — PROGRESS NOTES
Unable to facilitate echo today. Will arrange echo, 7 day monitor and outpatient follow up as outpatient. Okay to discharge. Garland Pablo D.O.   Cardiologist  Cardiology, [facility]

## 2023-01-31 NOTE — CARE COORDINATION
OBS LOC. Met w/ patient . Explained role of case manage rand plan of care. Lives w/ his parents in a 2 story house- 2 steps to entrance. Independent PTA. No Hx DMEs, HHC, or RADHA. PCP is Dr. Jonelle Fuller and pharmacy is [address] [place] [address]. Per pt, plan is to return home on discharge- parents will provide transportation. No needs.  Will follow Marni Jain RN case manager

## 2023-01-31 NOTE — H&P
[facility]  History and Physical      CHIEF COMPLAINT:    Chief Complaint   Patient presents with    Dizziness     States has been feeling light headed since this morning and feels like he is going to pass out     Tachycardia     Hx of panic attacks        Patient of Mary Yo MD presents with: Tachycardia    History of Present Illness:   Patient is a 78-year-old male with no significant past medical history who presents to the emergency room for dizziness and tachycardia. Patient states this has been off and on for some and now it has followed up with his primary care doctor in which he thought to be related to anxiety. Patient states that he woke up yesterday and was found to be more persistently lightheaded dizzy with palpitations, this prompted him to come to the emergency room for further evaluation. A chest x-ray was obtained which was negative for anything acute. He was found to have a mild leukocytosis of 11.9, but all other labs were completely unremarkable. He was found to be tachycardic at 138 on arrival with a blood pressure of 168/99. Patient was given a liter of fluids in the emergency room and today on morning vitals he was found to be normal sinus rhythm with a heart rate of 83. Patient will be admitted to [place] [address] with telemetry for further work-up and treatment. REVIEW OF SYSTEMS:  Pertinent negatives are above in HPI. 10 point ROS otherwise negative.       Past Medical History:   Diagnosis Date    Fractures 10/08/2012    Possible oblique fx at the base of the ulnar styloid    Metatarsalgia of left foot 2012    Mid and forefoot for 2 years - cavus foot    Sprain 11/18/2006    Left wrist sprain, possible scapholunate injury    Sprain 02/2008    Right knee medial collateral strain, possible contusion         Past Surgical History:   Procedure Laterality Date    PAROTIDECTOMY      TONSILLECTOMY      WISDOM TOOTH EXTRACTION         Medications Prior to Admission: No medications prior to admission. Note that the patient's home medications were reviewed and the above list is accurate to the best of my knowledge at the time of the exam.    Allergies:    Patient has no known allergies. Social History:    reports that he has never smoked. He has never used smokeless tobacco.    Family History:   family history is not on file. PHYSICAL EXAM:    Vitals:  BP (!) 154/86   Pulse 83   Temp 98.1 °F (36.7 °C) (Oral)   Resp 16   Ht 6' 3\" (1.905 m)   Wt (!) 337 lb 8 oz (153.1 kg)   SpO2 98%   BMI 42.18 kg/m²       General appearance: NAD, conversant  Eyes: Sclerae anicteric, PERRLA  HEENT: AT/NC, MMM  Neck: FROM, supple, no thyromegaly  Lymph: No cervical / supraclavicular lymphadenopathy  Lungs: Clear to auscultation, WOB normal  CV: RRR, no MRGs, no lower extremity edema  Abdomen: Soft, non-tender; no masses or HSM, +BS  Extremities: FROM without synovitis. No clubbing or cyanosis of the hands. Skin: no rash, induration, lesions, or ulcers  Psych: Calm and cooperative. Normal judgement and insight. Normal mood and affect. Neuro: Alert and interactive, face symmetric, speech fluent. LABS:  All labs reviewed.   Of note:  CBC:   Lab Results   Component Value Date/Time    WBC 10.1 01/31/2023 02:08 AM    RBC 4.88 01/31/2023 02:08 AM    HGB 14.4 01/31/2023 02:08 AM    HCT 43.3 01/31/2023 02:08 AM    MCV 88.7 01/31/2023 02:08 AM    MCH 29.5 01/31/2023 02:08 AM    MCHC 33.3 01/31/2023 02:08 AM    RDW 13.5 01/31/2023 02:08 AM     01/31/2023 02:08 AM    MPV 10.8 01/31/2023 02:08 AM     CMP:    Lab Results   Component Value Date/Time     01/31/2023 02:08 AM    K 3.5 01/31/2023 02:08 AM     01/31/2023 02:08 AM    CO2 23 01/31/2023 02:08 AM    BUN 8 01/31/2023 02:08 AM    CREATININE 0.8 01/31/2023 02:08 AM    LABGLOM >60 01/31/2023 02:08 AM    GLUCOSE 88 01/31/2023 02:08 AM    PROT 6.5 01/31/2023 02:08 AM    LABALBU 3.8 01/31/2023 02:08 AM    CALCIUM 8.8 01/31/2023 02:08 AM    BILITOT 0.5 01/31/2023 02:08 AM    ALKPHOS 62 01/31/2023 02:08 AM    AST 14 01/31/2023 02:08 AM    ALT 17 01/31/2023 02:08 AM       Imaging:  CXR: Negative     EKG:  Sinus tachycardia    Telemetry:  NSR    ASSESSMENT/PLAN:  Principal Problem:    Tachycardia  Resolved Problems:    * No resolved hospital problems. *    Patient is a 32year old male admitted to med/surg with telemetry for   Tachycardia  -Monitor labs  -Check TSH, T3 and T4-TSH is low normal, and T3 and T4 to be followed up as outpatient  -Cardiology consulted secondary to father with pacemaker in place and cardiac history and family  -Liter of fluids given in ER, now with normal heart rate  -Echocardiogram can be completed as an outpatient per cardio-  -May need outpatient cardiac monitor on discharge      Hypertensive   -167/99 on arrival, some improvement since admission  -Currently 154/86 today  -Start on lisinopril 5mg daily-advised to check blood pressures at home   -patient instructed to check blood pressure before taking medication in the morning then again 1 hour following as well as at night before bed and log readings and bring to PCP at follow up. Medication for other comorbidities continue as appropriate dose adjustment as necessary.     DVT prophylaxis Lovenox   PT OT  Discharge planning      Code status: Full  Requires Inpatient level of care    Saman Hays MD

## 2023-01-31 NOTE — DISCHARGE INSTRUCTIONS
patient to check blood pressure before taking medication in the morning then again 1 hour following as well as at night before bed and log readings and bring to PCP at follow up.

## 2023-01-31 NOTE — ED NOTES
SBAR receipt confirmed with Denise Marin on [place] [id number], [address] [facility]  01/30/23 8727

## 2023-01-31 NOTE — PROGRESS NOTES
Abdirahman SIDDIQUI notified via perfect serve that patient is ok for discharge and will follow up as an outpatient with Dr. Mirta Burkitt

## 2023-01-31 NOTE — PROGRESS NOTES
CLINICAL PHARMACY NOTE: MEDS TO BEDS    Total # of Prescriptions Filled: 1   The following medications were delivered to the patient:  Lisinopril 5    Additional Documentation:

## 2023-01-31 NOTE — CARE COORDINATION
Social Work / Discharge Planning : OBSERVATION. Patient admitted with Tachycardia. Patient independent from HOME. Patient has PCP and insurance. Anticipating no eneds for SW. AWait Plan. SW to follow.  Electronically signed by LUIS Ingram on 1/31/23 at 8:15 AM EST

## 2023-01-31 NOTE — PROGRESS NOTES
[facility]Tele  [address]  [facility] [address]  Phone: [phone]             January 31, 2023    Patient: Tamiko Richards   YOB: 1995   Date of Visit: 1/30/2023       To Whom It May Concern:    Tamiko Richards was seen and treated in our facility  beginning 1/30/2023 until 01/31/2023 .        Sincerely,       Antoinette Andrews RN         Signature:__________________________________

## 2023-01-31 NOTE — PLAN OF CARE
Problem: Discharge Planning  Goal: Discharge to home or other facility with appropriate resources  Outcome: Progressing  Flowsheets (Taken 1/30/2023 2103)  Discharge to home or other facility with appropriate resources: Identify barriers to discharge with patient and caregiver

## 2023-02-01 ENCOUNTER — TELEPHONE (OUTPATIENT)
Dept: CARDIOLOGY CLINIC | Age: 28
End: 2023-02-01

## 2023-02-01 ENCOUNTER — NURSE ONLY (OUTPATIENT)
Dept: CARDIOLOGY CLINIC | Age: 28
End: 2023-02-01

## 2023-02-01 ENCOUNTER — HOSPITAL ENCOUNTER (OUTPATIENT)
Dept: CARDIOLOGY | Age: 28
Discharge: HOME | End: 2023-02-01
Payer: COMMERCIAL

## 2023-02-01 DIAGNOSIS — R07.9 CHEST PAIN, UNSPECIFIED TYPE: Primary | ICD-10-CM

## 2023-02-01 DIAGNOSIS — R00.0 TACHYCARDIA: Primary | ICD-10-CM

## 2023-02-01 DIAGNOSIS — R07.9 CHEST PAIN, UNSPECIFIED TYPE: ICD-10-CM

## 2023-02-01 LAB
LV EF: 55 %
LVEF MODALITY: NORMAL

## 2023-02-01 PROCEDURE — 93306 TTE W/DOPPLER COMPLETE: CPT

## 2023-02-01 NOTE — TELEPHONE ENCOUNTER
----- Message from Cora Delvalle DO sent at 1/31/2023  3:50 PM EST -----  Please arrange echo and 7 day monitor (XT) as soon as able and a subsequent outpatient follow up. Rosaura Brown D.O.   Cardiologist  Cardiology, [facility]

## 2023-02-01 NOTE — PROGRESS NOTES
Patient was seen today and a 7 day [employer] XT monitor was placed. Monitor was ordered by . The monitor was applied, instructions were given to the patient. Patient stated understanding and gave verbalize readback.   Monitor company:[employer]  Serial number: [id number]

## 2023-02-01 NOTE — TELEPHONE ENCOUNTER
----- Message from Jonathan Shepard DO sent at 2/1/2023  3:32 PM EST -----  Please notify patient that their shows normal heart function and valves. His right ventricle is mildly enlarge suggesting he may have sleep apnea. Nothing dangerous. We will discuss the possibility of a sleep study on his OV.      Xochitl Van

## 2023-02-16 DIAGNOSIS — R00.0 TACHYCARDIA: ICD-10-CM

## 2023-03-10 ENCOUNTER — HOSPITAL ENCOUNTER (OUTPATIENT)
Dept: CARDIOLOGY | Age: 28
Discharge: HOME | End: 2023-03-10
Payer: COMMERCIAL

## 2023-03-10 ENCOUNTER — OFFICE VISIT (OUTPATIENT)
Dept: CARDIOLOGY CLINIC | Age: 28
End: 2023-03-10
Payer: COMMERCIAL

## 2023-03-10 VITALS
WEIGHT: 315 LBS | SYSTOLIC BLOOD PRESSURE: 120 MMHG | DIASTOLIC BLOOD PRESSURE: 70 MMHG | BODY MASS INDEX: 39.17 KG/M2 | HEIGHT: 75 IN | HEART RATE: 87 BPM

## 2023-03-10 VITALS
DIASTOLIC BLOOD PRESSURE: 76 MMHG | RESPIRATION RATE: 16 BRPM | WEIGHT: 315 LBS | HEART RATE: 88 BPM | SYSTOLIC BLOOD PRESSURE: 122 MMHG | BODY MASS INDEX: 39.17 KG/M2 | HEIGHT: 75 IN

## 2023-03-10 DIAGNOSIS — R00.2 PALPITATIONS: ICD-10-CM

## 2023-03-10 DIAGNOSIS — R07.9 CHEST PAIN, UNSPECIFIED TYPE: ICD-10-CM

## 2023-03-10 DIAGNOSIS — R07.9 CHEST PAIN, UNSPECIFIED TYPE: Primary | ICD-10-CM

## 2023-03-10 LAB
CHOLESTEROL, TOTAL: 190 MG/DL (ref 0–199)
HDLC SERPL-MCNC: 34 MG/DL
LDL CHOLESTEROL CALCULATED: 137 MG/DL (ref 0–99)
TRIGL SERPL-MCNC: 95 MG/DL (ref 0–149)
VLDLC SERPL CALC-MCNC: 19 MG/DL

## 2023-03-10 PROCEDURE — 93017 CV STRESS TEST TRACING ONLY: CPT

## 2023-03-10 PROCEDURE — 93000 ELECTROCARDIOGRAM COMPLETE: CPT | Performed by: INTERNAL MEDICINE

## 2023-03-10 PROCEDURE — 99214 OFFICE O/P EST MOD 30 MIN: CPT | Performed by: INTERNAL MEDICINE

## 2023-03-10 NOTE — PROCEDURES
[address] [facility] [address]  [phone]    Exercise Stress Study (non-nuclear)      Name: [facility] Account Number:  [de-identified]      :  1995          Sex: male         Date of Study:  3/10/2023    Height: 6' 3\" (190.5 cm)          Weight: (!) 336 lb (152.4 kg)    Ordering Provider: Louis Martinez DO          PCP: Padmini Pena MD    Cardiologist: Louis Martinez DO              Interpreting Physician: Williams Schneider MD    Indication:   Detecting the presence and location of coronary artery disease    Clinical History:   Patient has no known history of coronary artery disease. Exercise: The patient exercised using a Paul protocol, completing 6:23 minutes and reaching an estimated work load of 8.14 metabolic equivalents (METS). Resting HR was 94. Peak exercise heart rate was 181 ( 94% of maximum predicted heart rate for age). Baseline /76. Peak exercise /66. The blood pressure response to exercise was normal      Exercise was terminated due to dyspnea and heart rate attained. The patient experienced no chest pain with exercise. Exercise ECG:   The patient demonstrated no arrhythmias during exercise. With exercise, there were no ST segment changes of significance at the heart rate achieved. Goetz treadmill score was 6 implying low risk. Impression:    Exercise EKG was negative. The patient experienced no chest pain with exercise. Exercise capacity was below average. Low risk general exercise treadmill test.    Thank you for sending your patient to this [employer].     Electronically signed by Speedy Leavitt MD on 3/10/23 at 1:05 PM EST

## 2023-03-10 NOTE — PROGRESS NOTES
Patient was seen if office today for Lipid per Dr. Renee Guerra. Patient tolerated well in right hand.

## 2023-03-10 NOTE — PROGRESS NOTES
CHIEF COMPLAINT: Tachycardia    HISTORY OF PRESENT ILLNESS: Patient is a 32 y.o. male seen at the request of Pollo Bradford MD and not followed by cardiology. Patient presents in [place] with tachycardia issues. States he has had issues of racing heart for several years, however increased of late. States he was on the way to work when he had an episode. States heart rate went to 160's. States he gets lightheaded and some episodes of near syncope, but no erika syncope. Denies other known past history aside from obesity. Currently without symptoms. Past Medical History:   Diagnosis Date    Fractures 10/08/2012    Possible oblique fx at the base of the ulnar styloid    Metatarsalgia of left foot 2012    Mid and forefoot for 2 years - cavus foot    Sprain 11/18/2006    Left wrist sprain, possible scapholunate injury    Sprain 02/2008    Right knee medial collateral strain, possible contusion       Patient Active Problem List   Diagnosis    Tachycardia       No Known Allergies    Current Outpatient Medications   Medication Sig Dispense Refill    lisinopril (PRINIVIL;ZESTRIL) 5 MG tablet Take 1 tablet by mouth daily 30 tablet 3     No current facility-administered medications for this visit.        Social History     Socioeconomic History    Marital status: Single     Spouse name: Not on file    Number of children: Not on file    Years of education: Not on file    Highest education level: Not on file   Occupational History    Not on file   Tobacco Use    Smoking status: Never    Smokeless tobacco: Never   Vaping Use    Vaping Use: Never used   Substance and Sexual Activity    Alcohol use: Not Currently    Drug use: Never    Sexual activity: Not on file   Other Topics Concern    Not on file   Social History Narrative    Not on file     Social Determinants of Health     Financial Resource Strain: Not on file   Food Insecurity: Not on file   Transportation Needs: Not on file   Physical Activity: Not on file   Stress: Not on file   Social Connections: Not on file   Intimate Partner Violence: Not on file   Housing Stability: Not on file     No family history on file.    Review of Systems:   Heart: as above   Lungs: as above   Eyes: denies changes in vision or discharge.   Ears: denies changes in hearing or pain.   Nose: denies epistaxis or masses   Throat: denies sore throat or trouble swallowing.   Neuro: denies numbness, tingling, tremors.   Skin: denies rashes or itching.   : denies hematuria, dysuria   GI: denies vomiting, diarrhea   Psych: denies mood changed, anxiety, depression.  all others negative.    Physical Exam   /70   Pulse 87   Ht 6' 3\" (1.905 m)   Wt (!) 336 lb 1.6 oz (152.5 kg)   BMI 42.01 kg/m²   Constitutional: Oriented to person, place, and time. Well-developed and well-nourished. No distress.    Head: Normocephalic and atraumatic.   Eyes: EOM are normal. Pupils are equal, round, and reactive to light.   Neck: Normal range of motion. Neck supple. No hepatojugular reflux and no JVD present. Carotid bruit is not present. No tracheal deviation present. No thyromegaly present.   Cardiovascular: Normal rate, regular rhythm, normal heart sounds and intact distal pulses.  Exam reveals no gallop and no friction rub.  No murmur heard.  Pulmonary/Chest: Effort normal and breath sounds normal. No respiratory distress. No wheezes. No rales. No tenderness.   Abdominal: Soft. Bowel sounds are normal. No distension and no mass. No tenderness. No rebound and no guarding.   Musculoskeletal: Normal range of motion. No edema and no tenderness.   Lymphadenopathy:   No cervical adenopathy. No groin adenopathy.  Neurological: Alert and oriented to person, place, and time.   Skin: Skin is warm and dry. No rash noted. Not diaphoretic. No erythema.   Psychiatric: Normal mood and affect. Behavior is normal.     EKG: NSR, normal tracing.     Echo Summary 2/1/2023:   Ejection fraction is visually estimated at 55%.    No regional wall motion abnormalities seen. Mildly dilated right ventricle with normal function. Left atrial volume index of 19 ml per meters squared BSA. Physiologic and/or trace mitral regurgitation is present. No evidence for hemodynamically significant pericardial effusion. 7 day monitor 2/16/2023:  Predominant underlying rhythm was Sinus Rhythm. 1 run of Ventricular Tachycardia occurred lasting 4 beats. Isolated SVEs were rare (<1.0%), and no SVE Couplets or SVE Triplets were present. Isolated  VEs were rare (<1.0%), VE Couplets were rare (<1.0%), and no VE Triplets were present. Triggered events and reported symptoms were correlated with sinus rhythm, sinus tachycardia, PVCs and NSVTs. ASSESSMENT AND PLAN:  Patient Active Problem List   Diagnosis    Tachycardia     1. Tachycardia/NSVT:    Chart/labs normal/EKG reviewed. Labs okay on recent admission. Monitor relatively benign aside from 4 beats of NSVT. Echo normal.    ETT given NSVT. 2. HTN: Observe. 3. Lipids: Check labs. Celia Joshua D.O.   Cardiologist  Cardiology, [facility]

## 2023-03-13 ENCOUNTER — TELEPHONE (OUTPATIENT)
Dept: CARDIOLOGY CLINIC | Age: 28
End: 2023-03-13

## 2023-03-13 RX ORDER — ROSUVASTATIN CALCIUM 10 MG/1
10 TABLET, COATED ORAL DAILY
Qty: 90 TABLET | Refills: 3 | Status: SHIPPED | OUTPATIENT
Start: 2023-03-13

## 2023-03-13 NOTE — TELEPHONE ENCOUNTER
----- Message from Zulay Caal DO sent at 3/13/2023  2:14 PM EDT -----  Please notify patient that their cholesterol numbers show bad cholesterol is 137 with a goal of less than 100. Recommend starting rosuvastatin 10 mg daily and recheck in 6 months.

## 2023-03-24 NOTE — TELEPHONE ENCOUNTER
----- Message from Shahriar Collins DO sent at 3/13/2023  2:13 PM EDT -----  Please notify patient that their stress results are normal.
Left message for patient to call the office.
Patient was notified.
[FreeTextEntry1] : Patient is a 23 y/o P1 at 30 weeks presenting with vaginal itching, odor and discharge X 2 days. +FM, -LOF,-VB.

## 2023-04-03 ENCOUNTER — HOSPITAL ENCOUNTER (OUTPATIENT)
Age: 28
Discharge: HOME | End: 2023-04-05
Payer: COMMERCIAL

## 2023-04-03 ENCOUNTER — HOSPITAL ENCOUNTER (OUTPATIENT)
Dept: ULTRASOUND IMAGING | Age: 28
Discharge: HOME | End: 2023-04-05
Payer: COMMERCIAL

## 2023-04-03 DIAGNOSIS — R22.41 LOCALIZED SWELLING, MASS, OR LUMP OF LOWER EXTREMITY, RIGHT: ICD-10-CM

## 2023-04-03 PROCEDURE — 93971 EXTREMITY STUDY: CPT

## 2023-05-04 ENCOUNTER — TELEPHONE (OUTPATIENT)
Dept: CARDIOLOGY CLINIC | Age: 28
End: 2023-05-04

## 2023-05-04 RX ORDER — METOPROLOL SUCCINATE 25 MG/1
25 TABLET, EXTENDED RELEASE ORAL DAILY
Qty: 30 TABLET | Refills: 5 | Status: SHIPPED | OUTPATIENT
Start: 2023-05-04

## 2023-05-04 RX ORDER — METOPROLOL SUCCINATE 25 MG/1
25 TABLET, EXTENDED RELEASE ORAL DAILY
COMMUNITY
End: 2023-05-04 | Stop reason: SDUPTHER

## 2023-05-04 NOTE — TELEPHONE ENCOUNTER
----- Message from Negra Gilbert sent at 5/3/2023  7:57 PM EDT -----  Regarding: Additional Heart Issues  Contact: [phone]  Hi Dr. Austen De Dios,    I wanted to reach out because earlier this afternoon I was experiencing shortness of breath and tightness in my chest, dizziness, along with a racing heart similar to how I felt back in January when I ended up in the hospital. The feeling lasted for 10-15 minutes and then went away but has been recurring periodically throughout the evening. I thought about going back to the ER earlier today because of how long the feeling was lasting, along with experiencing more tightness in my chest than I did last time, but I did not because I started to feel better. I am trying to get some advice on what you think I should do, if anything.     Thank you,    Negra Gilbert

## 2023-05-25 RX ORDER — ROSUVASTATIN CALCIUM 10 MG/1
10 TABLET, COATED ORAL DAILY
Qty: 90 TABLET | Refills: 3 | Status: SHIPPED | OUTPATIENT
Start: 2023-05-25

## 2023-05-25 RX ORDER — METOPROLOL SUCCINATE 25 MG/1
25 TABLET, EXTENDED RELEASE ORAL DAILY
Qty: 90 TABLET | Refills: 3 | Status: SHIPPED | OUTPATIENT
Start: 2023-05-25

## 2023-08-15 ENCOUNTER — HOSPITAL ENCOUNTER (OUTPATIENT)
Dept: GENERAL RADIOLOGY | Age: 28
Discharge: HOME | End: 2023-08-17
Payer: COMMERCIAL

## 2023-08-15 ENCOUNTER — HOSPITAL ENCOUNTER (OUTPATIENT)
Age: 28
Discharge: HOME | End: 2023-08-17
Payer: COMMERCIAL

## 2023-08-15 DIAGNOSIS — M54.2 CERVICALGIA: ICD-10-CM

## 2023-08-15 PROCEDURE — 72050 X-RAY EXAM NECK SPINE 4/5VWS: CPT
